# Patient Record
Sex: FEMALE
[De-identification: names, ages, dates, MRNs, and addresses within clinical notes are randomized per-mention and may not be internally consistent; named-entity substitution may affect disease eponyms.]

---

## 2022-05-16 ENCOUNTER — HOSPITAL ENCOUNTER (OUTPATIENT)
Dept: HOSPITAL 33 - SDC | Age: 57
Discharge: HOME | End: 2022-05-16
Attending: SURGERY
Payer: COMMERCIAL

## 2022-05-16 VITALS — HEART RATE: 44 BPM | OXYGEN SATURATION: 100 %

## 2022-05-16 VITALS — DIASTOLIC BLOOD PRESSURE: 87 MMHG | SYSTOLIC BLOOD PRESSURE: 177 MMHG

## 2022-05-16 DIAGNOSIS — Z12.11: Primary | ICD-10-CM

## 2022-05-16 DIAGNOSIS — K63.9: ICD-10-CM

## 2022-05-16 NOTE — HP
DATE OF SURGERY:  05/16/2022 



HISTORY OF PRESENT ILLNESS:  The patient is a 56-year-old in need of screening 
colonoscopy. 



PAST MEDICAL HISTORY:  Hypothyroidism. She denies any other chronic illnesses. 



PAST SURGICAL HISTORY:  As per admission assessment. 



MEDICATIONS: Synthroid, Zyrtec, vitamin D3, multivitamin. 



ALLERGIES:  NKDA.

  

PHYSICAL EXAMINATION:  

GENERAL:  No acute distress.

HEENT: Sclerae nonicteric.

NECK:  No JVD.

CHEST: Equal excursion, nonlabored breathing. 

CVS:  Regular rate and rhythm. 

ABDOMEN:  Nondistended. 

EXTREMITIES:  No edema.

NEURO:  Alert, oriented. 

RECTAL: Deferred timed to endoscopy exam.

PSYCH: Appropriate mood and affect.  



IMPRESSION:  Need for screening colonoscopy. I feel she is a candidate. General risk of 
bleeding or infection, risk of bowel injury or perforation, risk of missed or nondiagnosis 
or incomplete exam, risk of bowel prep or sedation but not limited to, consent obtained. 
Will proceed with screening colonoscopy as an outpatient.

## 2022-05-17 NOTE — OP
SURGERY DATE/TIME:  05/16/2022  1107



PREOPERATIVE DIAGNOSIS:      Need for screening colonoscopy.



POSTOPERATIVE DIAGNOSES:     

1) Fairly good bowel prep. 

2) Small pale-appearing lesion sigmoid colon otherwise fairly normal colon exam.  

3) ASA Class II. 

4) Withdrawal time ten minutes. 



PROCEDURES:

1) Colonoscopy to cecum. 

2) Cold biopsy pale flat lesion versus scarring sigmoid colon biopsied with cold biopsy 
forceps. Good hemostasis noted. 



SURGEON:       Dr. Danielito Rodrigues.



ANESTHESIA:    MAC.



ESTIMATED BLOOD LOSS:    Minimal.  



INDICATIONS:  As noted above. Risks and benefits explained in detail but not limited to 
and consent obtained. 



DESCRIPTION OF PROCEDURE AND FINDINGS:  The patient is taken to the endoscopy room. MAC 
anesthesia induced. After official time out and no disagreement with planned procedure, 
digital rectal exam did not reveal any rectal masses. Video colonoscope was passed up 
through the slightly tortuous sigmoid, descending, transverse and ascending colon around 
to the cecum. Appendiceal orifice and valve well visualized and photo documented. Prep 
overall with one small area of a little bit of liquidy stool. Otherwise overall it was a 
fairly good prep. The scope is slowly and carefully withdrawn over the next ten minutes. 
There are no signs of any other large polyps, masses or obstructing lesions. There was one 
little pale, 2 mm or less size flat area in the sigmoid colon whether this was a scar or 
other etiology, it was removed with cold biopsy forceps. Good hemostasis noted. Otherwise 
the colonoscopy was fairly unremarkable. The patient tolerated the procedure well. There 
were no immediate complications. I will call her with the results when available in the 
next week or so.